# Patient Record
Sex: FEMALE | Race: WHITE | Employment: FULL TIME | URBAN - METROPOLITAN AREA
[De-identification: names, ages, dates, MRNs, and addresses within clinical notes are randomized per-mention and may not be internally consistent; named-entity substitution may affect disease eponyms.]

---

## 2019-08-13 ENCOUNTER — HOSPITAL ENCOUNTER (OUTPATIENT)
Age: 39
Discharge: HOME | End: 2019-08-15
Payer: COMMERCIAL

## 2019-08-13 ENCOUNTER — OFFICE VISIT (OUTPATIENT)
Dept: FAMILY MEDICINE CLINIC | Age: 39
End: 2019-08-13
Payer: COMMERCIAL

## 2019-08-13 VITALS
SYSTOLIC BLOOD PRESSURE: 115 MMHG | BODY MASS INDEX: 35.21 KG/M2 | WEIGHT: 260 LBS | HEIGHT: 72 IN | OXYGEN SATURATION: 100 % | RESPIRATION RATE: 14 BRPM | DIASTOLIC BLOOD PRESSURE: 82 MMHG | HEART RATE: 79 BPM

## 2019-08-13 DIAGNOSIS — Z00.00 ANNUAL PHYSICAL EXAM: ICD-10-CM

## 2019-08-13 DIAGNOSIS — Z80.3 FAMILY HISTORY OF BREAST CANCER IN FIRST DEGREE RELATIVE: ICD-10-CM

## 2019-08-13 DIAGNOSIS — Z00.00 ANNUAL PHYSICAL EXAM: Primary | ICD-10-CM

## 2019-08-13 LAB
ALBUMIN SERPL-MCNC: 4.5 G/DL (ref 3.5–5.2)
ALP BLD-CCNC: 79 U/L (ref 35–104)
ALT SERPL-CCNC: 18 U/L (ref 0–32)
ANION GAP SERPL CALCULATED.3IONS-SCNC: 14 MMOL/L (ref 7–16)
AST SERPL-CCNC: 19 U/L (ref 0–31)
BASOPHILS ABSOLUTE: 0.06 E9/L (ref 0–0.2)
BASOPHILS RELATIVE PERCENT: 0.7 % (ref 0–2)
BILIRUB SERPL-MCNC: 0.3 MG/DL (ref 0–1.2)
BUN BLDV-MCNC: 10 MG/DL (ref 6–20)
CALCIUM SERPL-MCNC: 9.7 MG/DL (ref 8.6–10.2)
CHLORIDE BLD-SCNC: 104 MMOL/L (ref 98–107)
CHOLESTEROL, TOTAL: 213 MG/DL (ref 0–199)
CO2: 23 MMOL/L (ref 22–29)
CREAT SERPL-MCNC: 0.8 MG/DL (ref 0.5–1)
EOSINOPHILS ABSOLUTE: 0.2 E9/L (ref 0.05–0.5)
EOSINOPHILS RELATIVE PERCENT: 2.4 % (ref 0–6)
GFR AFRICAN AMERICAN: >60
GFR NON-AFRICAN AMERICAN: >60 ML/MIN/1.73
GLUCOSE BLD-MCNC: 86 MG/DL (ref 74–99)
HCT VFR BLD CALC: 47.1 % (ref 34–48)
HDLC SERPL-MCNC: 51 MG/DL
HEMOGLOBIN: 14.9 G/DL (ref 11.5–15.5)
IMMATURE GRANULOCYTES #: 0.05 E9/L
IMMATURE GRANULOCYTES %: 0.6 % (ref 0–5)
LDL CHOLESTEROL CALCULATED: 149 MG/DL (ref 0–99)
LYMPHOCYTES ABSOLUTE: 2.94 E9/L (ref 1.5–4)
LYMPHOCYTES RELATIVE PERCENT: 35.6 % (ref 20–42)
MCH RBC QN AUTO: 28.4 PG (ref 26–35)
MCHC RBC AUTO-ENTMCNC: 31.6 % (ref 32–34.5)
MCV RBC AUTO: 89.9 FL (ref 80–99.9)
MONOCYTES ABSOLUTE: 0.37 E9/L (ref 0.1–0.95)
MONOCYTES RELATIVE PERCENT: 4.5 % (ref 2–12)
NEUTROPHILS ABSOLUTE: 4.64 E9/L (ref 1.8–7.3)
NEUTROPHILS RELATIVE PERCENT: 56.2 % (ref 43–80)
PDW BLD-RTO: 14.1 FL (ref 11.5–15)
PLATELET # BLD: 313 E9/L (ref 130–450)
PMV BLD AUTO: 11.9 FL (ref 7–12)
POTASSIUM SERPL-SCNC: 4.3 MMOL/L (ref 3.5–5)
RBC # BLD: 5.24 E12/L (ref 3.5–5.5)
SODIUM BLD-SCNC: 141 MMOL/L (ref 132–146)
TOTAL PROTEIN: 7.6 G/DL (ref 6.4–8.3)
TRIGL SERPL-MCNC: 67 MG/DL (ref 0–149)
TSH SERPL DL<=0.05 MIU/L-ACNC: 1.06 UIU/ML (ref 0.27–4.2)
VLDLC SERPL CALC-MCNC: 13 MG/DL
WBC # BLD: 8.3 E9/L (ref 4.5–11.5)

## 2019-08-13 PROCEDURE — 80053 COMPREHEN METABOLIC PANEL: CPT

## 2019-08-13 PROCEDURE — 80061 LIPID PANEL: CPT

## 2019-08-13 PROCEDURE — 84443 ASSAY THYROID STIM HORMONE: CPT

## 2019-08-13 PROCEDURE — 99385 PREV VISIT NEW AGE 18-39: CPT | Performed by: FAMILY MEDICINE

## 2019-08-13 PROCEDURE — 85025 COMPLETE CBC W/AUTO DIFF WBC: CPT

## 2019-08-13 ASSESSMENT — ENCOUNTER SYMPTOMS
SHORTNESS OF BREATH: 0
ABDOMINAL PAIN: 0
SORE THROAT: 0
CONSTIPATION: 0
COUGH: 0
EYE PAIN: 0
BACK PAIN: 0
SINUS PRESSURE: 0
DIARRHEA: 0

## 2019-08-13 ASSESSMENT — PATIENT HEALTH QUESTIONNAIRE - PHQ9
SUM OF ALL RESPONSES TO PHQ QUESTIONS 1-9: 0
SUM OF ALL RESPONSES TO PHQ9 QUESTIONS 1 & 2: 0
1. LITTLE INTEREST OR PLEASURE IN DOING THINGS: 0
SUM OF ALL RESPONSES TO PHQ QUESTIONS 1-9: 0
2. FEELING DOWN, DEPRESSED OR HOPELESS: 0

## 2019-08-13 NOTE — PROGRESS NOTES
or tenderness. Lymphadenopathy:     She has no cervical adenopathy. Neurological: She is alert and oriented to person, place, and time. She has normal reflexes. Skin: Skin is warm and dry. No rash noted. Psychiatric: She has a normal mood and affect. Her behavior is normal.   Nursing note and vitals reviewed. Laboratory/Imaging: All laboratory and radiology results have been personally reviewed by myself      ASSESSMENT/PLAN:     Diagnosis Orders   1. Annual physical exam  CBC Auto Differential    Comprehensive Metabolic Panel    Lipid Panel    TSH without Reflex   2. Family history of breast cancer in first degree relative       Doing well overall. Labs to be completed for baseline. Will radha don [employer] genetic testing for brca. HM reviewed today and updated as appropriate  Call or go to ED immediately if symptoms worsen or persist.  No future appointments. Or sooner if necessary. Educational materials and/or home exercises printed for patient's review and were included inpatient instructions on his/her After Visit Summary and given to patient at the end of visit.       Counseled regarding above diagnosis, including possible risks and complications,  especially if left uncontrolled.     Counseled regarding the possible side effects, risks, benefits and alternatives to treatment; patient and/or guardian verbalizes understanding, agrees, feels comfortable with and wishes to proceed withabove treatment plan.     Advised patient to call with any new medication issues, and read all Rx infofrom pharmacy to assure aware of all possible risks and side effects of medication before taking.     Reviewed age and gender appropriate health screening exams and vaccinations.   Advised patient regarding importance of keeping up with recommended health maintenance and to schedule as soon as possible if overdue, as this isimportant in assessing for undiagnosed pathology, especially cancer, as well as

## 2023-01-16 ENCOUNTER — HOSPITAL ENCOUNTER (OUTPATIENT)
Dept: MAMMOGRAPHY | Age: 43
Discharge: HOME | End: 2023-01-18
Payer: COMMERCIAL

## 2023-01-16 DIAGNOSIS — Z12.31 BREAST CANCER SCREENING BY MAMMOGRAM: ICD-10-CM

## 2023-01-16 PROCEDURE — 77067 SCR MAMMO BI INCL CAD: CPT

## 2023-01-17 ENCOUNTER — TELEPHONE (OUTPATIENT)
Dept: GENERAL RADIOLOGY | Age: 43
End: 2023-01-17

## 2023-01-17 NOTE — TELEPHONE ENCOUNTER
Call to patient in reference to her mammogram performed at [facility] on January 16, 2023. Instructed patient that the radiologist has recommended some additional breast imaging in order to make a final determination/result. Informed her that a request for Rx has been faxed to the ordering physician. Once we receive the script a  from [facility] will contact her to schedule the additional imaging study/studies. Verbalizes understanding and is agreeable to proceed at [facility].

## 2023-02-03 ENCOUNTER — HOSPITAL ENCOUNTER (OUTPATIENT)
Dept: GENERAL RADIOLOGY | Age: 43
Discharge: HOME | End: 2023-02-03
Payer: COMMERCIAL

## 2023-02-03 ENCOUNTER — HOSPITAL ENCOUNTER (OUTPATIENT)
Dept: GENERAL RADIOLOGY | Age: 43
End: 2023-02-03
Payer: COMMERCIAL

## 2023-02-03 DIAGNOSIS — R92.8 ABNORMAL MAMMOGRAM OF LEFT BREAST: ICD-10-CM

## 2023-02-03 DIAGNOSIS — R92.8 ABNORMAL MAMMOGRAM OF BOTH BREASTS: ICD-10-CM

## 2023-02-03 PROCEDURE — G0279 TOMOSYNTHESIS, MAMMO: HCPCS

## 2023-02-03 PROCEDURE — 76642 ULTRASOUND BREAST LIMITED: CPT

## 2023-02-14 ENCOUNTER — HOSPITAL ENCOUNTER (OUTPATIENT)
Dept: GENERAL RADIOLOGY | Age: 43
Discharge: HOME | End: 2023-02-16
Payer: COMMERCIAL

## 2023-02-14 DIAGNOSIS — R92.8 ABNORMAL MAMMOGRAM: ICD-10-CM

## 2023-02-14 PROCEDURE — 88305 TISSUE EXAM BY PATHOLOGIST: CPT

## 2023-02-14 PROCEDURE — 19081 BX BREAST 1ST LESION STRTCTC: CPT

## 2023-02-14 PROCEDURE — 2500000003 HC RX 250 WO HCPCS

## 2023-02-14 NOTE — PROGRESS NOTES
Met with patient prior to her breast biopsy. Instructed on stereotactic  breast biopsy procedure. Instructed that results will be available in approximately 3-5 business days. She confirms that she has an active [employer] account and is agreeable to discussion of breast biopsy results by phone. Instructed that her physician will also be notified of results. Provided with folder containing my contact information and post biopsy discharge instructions. Instructed to call me if she has any questions or concerns about her biopsy. Verbalizes understanding.   Electronically signed by Lola Sierra RN, BSN on 2/14/2023 at 9:22 AM

## 2023-02-22 ENCOUNTER — TELEPHONE (OUTPATIENT)
Dept: GENERAL RADIOLOGY | Age: 43
End: 2023-02-22

## 2023-08-28 ENCOUNTER — TELEPHONE (OUTPATIENT)
Dept: BARIATRICS/WEIGHT MGMT | Age: 43
End: 2023-08-28

## 2023-10-05 ENCOUNTER — TELEPHONE (OUTPATIENT)
Dept: BARIATRICS/WEIGHT MGMT | Age: 43
End: 2023-10-05

## 2023-10-10 ENCOUNTER — TELEPHONE (OUTPATIENT)
Dept: BARIATRICS/WEIGHT MGMT | Age: 43
End: 2023-10-10

## 2025-01-06 NOTE — TELEPHONE ENCOUNTER
LOS: 0 days   Patient Care Team:  Yvan Ruiz III, NP-C as PCP - General (Internal Medicine)  Bel Marina MD as Referring Physician (General Surgery)  Hunter Acosta MD as Consulting Physician (Radiation Oncology)  Ricky Dill MD as Consulting Physician (Hematology and Oncology)  Jose Martin Liu APRN as Nurse Practitioner (Family Medicine)  Zack Mohr MD as Consulting Physician (Cardiology)  Tatyana Dozier RN as Ambulatory  ([facility])    Chief Complaint: Follow-up syncope, pauses.    Interval History: She had a symptomatic 8-second pause on telemetry yesterday evening.  She did not become syncopal, but was near syncopal.  No chest pain or shortness of breath.    Vital Signs:  Temp:  [98 °F (36.7 °C)-99 °F (37.2 °C)] 98 °F (36.7 °C)  Heart Rate:  [64-93] 73  Resp:  [16-19] 16  BP: (122-164)/(69-82) 141/82    Intake/Output Summary (Last 24 hours) at 1/6/2025 1233  Last data filed at 1/5/2025 1800  Gross per 24 hour   Intake 240 ml   Output --   Net 240 ml       Physical Exam:   General Appearance:    No acute distress, alert and oriented x4, appears younger than stated age.   Lungs:     Clear to auscultation bilaterally     Heart:    Regular rhythm and normal rate.  III/VI SM RUSB.   Abdomen:     Soft, nontender, nondistended.    Extremities:   No clubbing, cyanosis, or edema.     Results Review:    Results from last 7 days   Lab Units 01/06/25  0500   SODIUM mmol/L 141   POTASSIUM mmol/L 4.3   CHLORIDE mmol/L 107   CO2 mmol/L 24.0   BUN mg/dL 20   CREATININE mg/dL 0.99   GLUCOSE mg/dL 83   CALCIUM mg/dL 8.7     Results from last 7 days   Lab Units 01/06/25  0500 01/05/25  0343 01/04/25  1743   HSTROP T ng/L 20* 19* 21*     Results from last 7 days   Lab Units 01/06/25  0500   WBC 10*3/mm3 9.98   HEMOGLOBIN g/dL 10.8*   HEMATOCRIT % 34.4   PLATELETS 10*3/mm3 192     Results from last 7 days   Lab Units 01/04/25  1614   INR  1.19*         Results from last 7 days  Patient notified of benign breast biopsy findings and performing radiologist's recommendation for annual screening mammogram.  Patient verbalizes understanding. Breast biopsy results faxed to [facility].  Electronically signed by Meena Gunter RN, BSN on 2/22/2023 at 10:44 AM   Lab Units 01/04/25  1614   MAGNESIUM mg/dL 2.1           I reviewed the patient's new clinical results.        Assessment:  1.  Syncope, secondary to #2  2.  Complete AV block with associated 8-second pause by telemetry on 1/5/2025  3.  Mild aortic stenosis by echo  4.  Moderate mitral regurgitation by echo  5.  Elevated D-dimer (no PE by CTA, and negative venous Doppler)  6.  Hypertrophic cardiomyopathy without LVOT obstruction by echo  7.  Hypertension    Plan:  -Again, the patient had complete AV block with an associated 8-second pause yesterday afternoon which was symptomatic.  She was near syncopal.    -On speaking with her, she has had multiple episodes of near syncope since September 2024, which often happen without warning and occur while seated.  This would also fit with recurrent high-grade AV block or pauses.    -Appreciate electrophysiology seeing the patient.  Plans noted for a permanent pacemaker tomorrow.  If she has further symptomatic pauses, could consider TVP.    -Her TSH was checked and was normal.  I stopped her carvedilol for now, although this can be resumed after the pacemaker is placed tomorrow.  I increased her amlodipine to 5 mg twice daily temporarily for better blood pressure control off of the carvedilol.    Khoi Espinoza MD  01/06/25  12:33 EST